# Patient Record
Sex: FEMALE | Race: OTHER | URBAN - METROPOLITAN AREA
[De-identification: names, ages, dates, MRNs, and addresses within clinical notes are randomized per-mention and may not be internally consistent; named-entity substitution may affect disease eponyms.]

---

## 2020-01-13 ENCOUNTER — OFFICE VISIT (OUTPATIENT)
Dept: FAMILY MEDICINE CLINIC | Age: 65
End: 2020-01-13

## 2020-01-13 VITALS
RESPIRATION RATE: 16 BRPM | HEART RATE: 97 BPM | SYSTOLIC BLOOD PRESSURE: 140 MMHG | OXYGEN SATURATION: 96 % | DIASTOLIC BLOOD PRESSURE: 78 MMHG | WEIGHT: 155 LBS | TEMPERATURE: 98.1 F | HEIGHT: 59 IN | BODY MASS INDEX: 31.25 KG/M2

## 2020-01-13 DIAGNOSIS — E53.8 LOW SERUM VITAMIN B12: ICD-10-CM

## 2020-01-13 DIAGNOSIS — R53.82 CHRONIC FATIGUE: ICD-10-CM

## 2020-01-13 DIAGNOSIS — Z13.1 SCREENING FOR DIABETES MELLITUS: ICD-10-CM

## 2020-01-13 DIAGNOSIS — R42 VERTIGO, INTERMITTENT: ICD-10-CM

## 2020-01-13 DIAGNOSIS — Z11.59 ENCOUNTER FOR HEPATITIS C SCREENING TEST FOR LOW RISK PATIENT: ICD-10-CM

## 2020-01-13 DIAGNOSIS — Z13.0 SCREENING FOR DEFICIENCY ANEMIA: ICD-10-CM

## 2020-01-13 DIAGNOSIS — E78.2 MIXED HYPERLIPIDEMIA: Primary | ICD-10-CM

## 2020-01-13 DIAGNOSIS — M19.90 ARTHRITIS: ICD-10-CM

## 2020-01-13 DIAGNOSIS — Z12.39 SCREENING FOR BREAST CANCER: ICD-10-CM

## 2020-01-13 PROBLEM — K21.9 GASTROESOPHAGEAL REFLUX DISEASE WITHOUT ESOPHAGITIS: Status: ACTIVE | Noted: 2020-01-13

## 2020-01-13 PROBLEM — G47.33 OSA ON CPAP: Status: ACTIVE | Noted: 2020-01-13

## 2020-01-13 PROBLEM — M81.8 OTHER OSTEOPOROSIS WITHOUT CURRENT PATHOLOGICAL FRACTURE: Status: ACTIVE | Noted: 2020-01-13

## 2020-01-13 PROBLEM — F51.01 PRIMARY INSOMNIA: Status: ACTIVE | Noted: 2020-01-13

## 2020-01-13 PROBLEM — Z99.89 OSA ON CPAP: Status: ACTIVE | Noted: 2020-01-13

## 2020-01-13 PROBLEM — H25.9 AGE-RELATED CATARACT OF BOTH EYES: Status: ACTIVE | Noted: 2020-01-13

## 2020-01-13 RX ORDER — MECLIZINE HYDROCHLORIDE 25 MG/1
25 TABLET ORAL AS NEEDED
Qty: 10 TAB | Refills: 1 | Status: SHIPPED | OUTPATIENT
Start: 2020-01-13

## 2020-01-13 RX ORDER — MECLIZINE HYDROCHLORIDE 25 MG/1
TABLET ORAL AS NEEDED
COMMUNITY
End: 2020-01-13 | Stop reason: SDUPTHER

## 2020-01-13 RX ORDER — TRAZODONE HYDROCHLORIDE 50 MG/1
50 TABLET ORAL
COMMUNITY
Start: 2019-03-19 | End: 2020-05-12 | Stop reason: SDUPTHER

## 2020-01-13 RX ORDER — CYCLOBENZAPRINE HCL 10 MG
10 TABLET ORAL AS NEEDED
COMMUNITY
End: 2020-05-12 | Stop reason: SDUPTHER

## 2020-01-13 RX ORDER — PANTOPRAZOLE SODIUM 40 MG/1
TABLET, DELAYED RELEASE ORAL
COMMUNITY
Start: 2020-01-05 | End: 2020-03-06 | Stop reason: SDUPTHER

## 2020-01-13 RX ORDER — BISMUTH SUBSALICYLATE 262 MG
1 TABLET,CHEWABLE ORAL DAILY
COMMUNITY

## 2020-01-13 RX ORDER — DICLOFENAC SODIUM 75 MG/1
75 TABLET, DELAYED RELEASE ORAL 2 TIMES DAILY
COMMUNITY
Start: 2019-09-03 | End: 2020-01-13 | Stop reason: SDUPTHER

## 2020-01-13 RX ORDER — SIMVASTATIN 10 MG/1
10 TABLET, FILM COATED ORAL
Qty: 90 TAB | Refills: 1 | Status: SHIPPED | OUTPATIENT
Start: 2020-01-13 | End: 2020-02-10 | Stop reason: SDUPTHER

## 2020-01-13 RX ORDER — LANOLIN ALCOHOL/MO/W.PET/CERES
1000 CREAM (GRAM) TOPICAL DAILY
COMMUNITY

## 2020-01-13 RX ORDER — DICLOFENAC SODIUM 75 MG/1
75 TABLET, DELAYED RELEASE ORAL
Qty: 90 TAB | Refills: 0 | Status: SHIPPED | OUTPATIENT
Start: 2020-01-13 | End: 2020-05-06 | Stop reason: SDUPTHER

## 2020-01-13 RX ORDER — SIMVASTATIN 10 MG/1
TABLET, FILM COATED ORAL
COMMUNITY
Start: 2019-11-18 | End: 2020-01-13 | Stop reason: SDUPTHER

## 2020-01-13 NOTE — PROGRESS NOTES
Ana Lilia Chow  59 y.o. female  1955  [id number]  <[id number]>     R Dany Carlson 53       Encounter Date: 1/13/2020           New Patient Visit Note: Susan Velázquez MD    Previous PCP:    Reason for Appointment:  Chief Complaint   Patient presents with    New Patient    Establish Care    Medication Refill       History of Present Illness:  History provided by patient    Ana Lilia Chow is a 59 y.o. female who presents to clinic today for:     Mixed hyperlipidemia     Duration: age 58  Medicine: Lipitor 10mg   Medication Side Effects: denies any muscle aches  Last Lipids: 4 months ago with prior PCP      Arthritis     Duration: around age 62  Location: left hip and left knee  Medicine: Voltaren 75mg PRN  Reports having injection in knee      Vertigo, intermittent     Duration: strated around age 39  Medicines: Meclizine 25mg PRN  Symptoms: imbalaance and dysquilibrium  Current Symptoms: denies  Freqeuncy: 2-3 times per month  Timing: symptoms last a few minutes to a few hours  Imaginag: denies any prior imaging  ENT: reports that a long time ago she saw an ENT who told her that she had vertigo             Review of Systems    Denies dyspnea or CP: All other ROS were reviewed and are negative except as discussed in HPI      Allergies: Sulfa (sulfonamide antibiotics)    Medications: (Updated to reflect final medication list after visit)    Current Outpatient Medications:     pantoprazole (PROTONIX) 40 mg tablet, TAKE 1 TABLET BY MOUTH ONCE DAILY, Disp: , Rfl:     traZODone (DESYREL) 50 mg tablet, 50 mg nightly. Patient reports 1 or 2 tablet at bedtime. , Disp: , Rfl:     cyclobenzaprine (FLEXERIL) 10 mg tablet, Take 10 mg by mouth as needed. Patient reports 1/2 tablet as needed, Disp: , Rfl:     multivitamin (ONE A DAY) tablet, Take 1 Tab by mouth daily. , Disp: , Rfl:     cyanocobalamin 1,000 mcg tablet, Take 1,000 mcg by mouth daily. , Disp: , Rfl:    cholecalciferol, vitamin D3, (VITAMIN D3 PO), Take  by mouth., Disp: , Rfl:     simvastatin (ZOCOR) 10 mg tablet, Take 1 Tab by mouth nightly., Disp: 90 Tab, Rfl: 1    diclofenac EC (VOLTAREN) 75 mg EC tablet, Take 1 Tab by mouth nightly as needed for Pain., Disp: 90 Tab, Rfl: 0    meclizine (ANTIVERT) 25 mg tablet, Take 1 Tab by mouth as needed for Dizziness. , Disp: 10 Tab, Rfl: 1    History  No care team member to display    Past Medical History: she has no past medical history on file. Past Surgical History: she has a past surgical history that includes hx  section and hx cholecystectomy. Family Medical History: family history includes Coronary Artery Disease in her father; Heart Disease in her mother. denies stroke, DM, or cancer    Social History: she reports that she quit smoking about 8 years ago. Her smoking use included cigarettes. She has never used smokeless tobacco. She reports previous alcohol use. She reports that she does not use drugs. Occupation: works as baker at [employer]; Home: daughter and granddaugher; cat (Tamar)      Objective:   Visit Vitals  /78 (BP 1 Location: Left arm, BP Patient Position: Sitting)   Pulse 97   Temp 98.1 °F (36.7 °C) (Oral)   Resp 16   Ht 4' 11\" (1.499 m)   Wt 155 lb (70.3 kg)   SpO2 96%   BMI 31.31 kg/m²     Wt Readings from Last 3 Encounters:   20 155 lb (70.3 kg)       Physical Exam  Vitals signs reviewed. HENT:      Head: Normocephalic and atraumatic. Right Ear: Hearing normal. No drainage. No middle ear effusion. There is no impacted cerumen. Tympanic membrane is not injected or erythematous. Left Ear: Hearing normal. No drainage. No middle ear effusion. There is no impacted cerumen. Tympanic membrane is not injected or erythematous. Nose: Nose normal. No nasal deformity or septal deviation. Mouth/Throat:      Lips: Pink. No lesions. Mouth: Mucous membranes are moist.      Palate: No mass.       Pharynx: Oropharynx is clear. Uvula midline. No pharyngeal swelling, oropharyngeal exudate or posterior oropharyngeal erythema. Tonsils: No tonsillar exudate. Eyes:      General: Lids are normal. Vision grossly intact. Gaze aligned appropriately. Extraocular Movements: Extraocular movements intact. Conjunctiva/sclera:      Right eye: Right conjunctiva is not injected. Left eye: Left conjunctiva is not injected. Pupils: Pupils are equal, round, and reactive to light. Neck:      Musculoskeletal: Normal range of motion and neck supple. No neck rigidity or muscular tenderness. Vascular: No carotid bruit. Cardiovascular:      Rate and Rhythm: Normal rate and regular rhythm. Heart sounds: No murmur. No friction rub. No gallop. Pulmonary:      Effort: No respiratory distress. Breath sounds: No wheezing, rhonchi or rales. Abdominal:      General: Bowel sounds are normal. There is no distension or abdominal bruit. Tenderness: There is no tenderness. Musculoskeletal: Normal range of motion. Cervical back: Normal.   Lymphadenopathy:      Cervical: No cervical adenopathy. Neurological:      General: No focal deficit present. Motor: Motor function is intact. Coordination: Coordination is intact. Gait: Gait is intact. Deep Tendon Reflexes:      Reflex Scores:       Patellar reflexes are 2+ on the right side and 2+ on the left side. Psychiatric:         Attention and Perception: Attention and perception normal.         Mood and Affect: Mood and affect normal.         Speech: Speech normal.         Cognition and Memory: Cognition and memory normal.         Assessment & Plan:    Diagnoses and all orders for this visit:    1. Mixed hyperlipidemia  Assessment & Plan:  Chronic, stable  - continue Lipitor  - recheck lipids in Fall, 2020    Orders:  -     simvastatin (ZOCOR) 10 mg tablet; Take 1 Tab by mouth nightly.   -     LIPID PANEL  -     METABOLIC PANEL, COMPREHENSIVE    2. Arthritis  Assessment & Plan:  Chronic, stable and improved with PRN Voltaren  - continue PRN voltaren  - consider referral to pain management if symptoms worsen or become more frequent      Orders:  -     diclofenac EC (VOLTAREN) 75 mg EC tablet; Take 1 Tab by mouth nightly as needed for Pain. 3. Vertigo, intermittent  Assessment & Plan:  Chronic, stable and improved with PRN meclizine  - continue PRN meclizine  - discussed red flag symptoms and reasons to call or go to ED  - if symptoms become more frequent, patient may wish to consider vestibular therapy    Orders:  -     meclizine (ANTIVERT) 25 mg tablet; Take 1 Tab by mouth as needed for Dizziness. 4. Chronic fatigue  -     VITAMIN D, 25 HYDROXY; Future  -     TSH 3RD GENERATION    5. Low serum vitamin B12  -     VITAMIN B12    6. Screening for diabetes mellitus  -     HEMOGLOBIN A1C WITH EAG    7. Screening for deficiency anemia  -     CBC WITH AUTOMATED DIFF    8. Screening for breast cancer  -     BEBETO MAMMO BI SCREENING INCL CAD; Future    9. Encounter for hepatitis C screening test for low risk patient  -     HCV AB W/RFLX TO JASPREET      I have discussed the diagnosis with the patient and the intended plan as seen in the above orders. The patient has received an after-visit summary along with patient information handout. I have discussed medication side effects and warnings with the patient as well. Dispostion  Follow-up and Dispositions    · Return in about 4 weeks (around 2/10/2020).            Nadia Alvarez MD

## 2020-01-13 NOTE — PATIENT INSTRUCTIONS
Arthritis: Care Instructions  Your Care Instructions  Arthritis, also called osteoarthritis, is a breakdown of the cartilage that cushions your joints. When the cartilage wears down, your bones rub against each other. This causes pain and stiffness. Many people have some arthritis as they age. Arthritis most often affects the joints of the spine, hands, hips, knees, or feet. You can take simple measures to protect your joints, ease your pain, and help you stay active. Follow-up care is a key part of your treatment and safety. Be sure to make and go to all appointments, and call your doctor if you are having problems. It's also a good idea to know your test results and keep a list of the medicines you take. How can you care for yourself at home? · Stay at a healthy weight. Being overweight puts extra strain on your joints. · Talk to your doctor or physical therapist about exercises that will help ease joint pain. ? Stretch. You may enjoy gentle forms of yoga to help keep your joints and muscles flexible. ? Walk instead of jog. Other types of exercise that are less stressful on the joints include riding a bicycle, swimming, esperanza chi, or water exercise. ? Lift weights. Strong muscles help reduce stress on your joints. Stronger thigh muscles, for example, take some of the stress off of the knees and hips. Learn the right way to lift weights so you do not make joint pain worse. · Take your medicines exactly as prescribed. Call your doctor if you think you are having a problem with your medicine. · Take pain medicines exactly as directed. ? If the doctor gave you a prescription medicine for pain, take it as prescribed. ? If you are not taking a prescription pain medicine, ask your doctor if you can take an over-the-counter medicine. · Use a cane, crutch, walker, or another device if you need help to get around. These can help rest your joints.  You also can use other things to make life easier, such as a higher toilet seat and padded handles on kitchen utensils. · Do not sit in low chairs, which can make it hard to get up. · Put heat or cold on your sore joints as needed. Use whichever helps you most. You also can take turns with hot and cold packs. ? Apply heat 2 or 3 times a day for 20 to 30 minutesusing a heating pad, hot shower, or hot packto relieve pain and stiffness. ? Put ice or a cold pack on your sore joint for 10 to 20 minutes at a time. Put a thin cloth between the ice and your skin. When should you call for help? Call your doctor now or seek immediate medical care if:    · You have sudden swelling, warmth, or pain in any joint.     · You have joint pain and a fever or rash.     · You have such bad pain that you cannot use a joint.    Watch closely for changes in your health, and be sure to contact your doctor if:    · You have mild joint symptoms that continue even with more than 6 weeks of care at home.     · You have stomach pain or other problems with your medicine. Where can you learn more? Go to http://janina-stephany.info/. Enter H326 in the search box to learn more about \"Arthritis: Care Instructions. \"  Current as of: April 1, 2019  Content Version: 12.2  © 5760-8995 [employer]. Care instructions adapted under license by [employer] (which disclaims liability or warranty for this information). If you have questions about a medical condition or this instruction, always ask your healthcare professional. [place] [address] any warranty or liability for your use of this information.

## 2020-01-13 NOTE — PROGRESS NOTES
1. Have you been to the ER, urgent care clinic since your last visit? Hospitalized since your last visit? No    2. Have you seen or consulted any other health care providers outside of the [address] [place] [address] since your last visit? Include any pap smears or colon screening. No     Last PCP Zachary BautistaJohn [place] [address]  Phone: [phone]    Chief Complaint   Patient presents with   [facility] Patient    Establish Care    Medication Refill     Visit Vitals  /78 (BP 1 Location: Left arm, BP Patient Position: Sitting)   Pulse 97   Temp 98.1 °F (36.7 °C) (Oral)   Resp 16   Ht 4' 11\" (1.499 m)   Wt 155 lb (70.3 kg)   SpO2 96%   BMI 31.31 kg/m²     Records Requested via fax to Last PCP.

## 2020-01-14 LAB
ALBUMIN SERPL-MCNC: 4.3 G/DL (ref 3.6–4.8)
ALBUMIN/GLOB SERPL: 1.7 {RATIO} (ref 1.2–2.2)
ALP SERPL-CCNC: 142 IU/L (ref 39–117)
ALT SERPL-CCNC: 71 IU/L (ref 0–32)
AST SERPL-CCNC: 45 IU/L (ref 0–40)
BASOPHILS # BLD AUTO: 0.1 X10E3/UL (ref 0–0.2)
BASOPHILS NFR BLD AUTO: 1 %
BILIRUB SERPL-MCNC: 0.3 MG/DL (ref 0–1.2)
BUN SERPL-MCNC: 8 MG/DL (ref 8–27)
BUN/CREAT SERPL: 9 (ref 12–28)
CALCIUM SERPL-MCNC: 9.4 MG/DL (ref 8.7–10.3)
CHLORIDE SERPL-SCNC: 104 MMOL/L (ref 96–106)
CHOLEST SERPL-MCNC: 267 MG/DL (ref 100–199)
CO2 SERPL-SCNC: 25 MMOL/L (ref 20–29)
COMMENT, 011824: ABNORMAL
CREAT SERPL-MCNC: 0.89 MG/DL (ref 0.57–1)
EOSINOPHIL # BLD AUTO: 0.1 X10E3/UL (ref 0–0.4)
EOSINOPHIL NFR BLD AUTO: 1 %
ERYTHROCYTE [DISTWIDTH] IN BLOOD BY AUTOMATED COUNT: 11.7 % (ref 11.7–15.4)
EST. AVERAGE GLUCOSE BLD GHB EST-MCNC: 117 MG/DL
GLOBULIN SER CALC-MCNC: 2.6 G/DL (ref 1.5–4.5)
GLUCOSE SERPL-MCNC: 89 MG/DL (ref 65–99)
HBA1C MFR BLD: 5.7 % (ref 4.8–5.6)
HCT VFR BLD AUTO: 46.1 % (ref 34–46.6)
HCV AB S/CO SERPL IA: <0.1 S/CO RATIO (ref 0–0.9)
HCV AB SERPL QL IA: NORMAL
HDLC SERPL-MCNC: 44 MG/DL
HGB BLD-MCNC: 15.1 G/DL (ref 11.1–15.9)
IMM GRANULOCYTES # BLD AUTO: 0 X10E3/UL (ref 0–0.1)
IMM GRANULOCYTES NFR BLD AUTO: 1 %
INTERPRETATION, 910389: NORMAL
LDLC SERPL CALC-MCNC: 197 MG/DL (ref 0–99)
LYMPHOCYTES # BLD AUTO: 3.3 X10E3/UL (ref 0.7–3.1)
LYMPHOCYTES NFR BLD AUTO: 43 %
MCH RBC QN AUTO: 30.8 PG (ref 26.6–33)
MCHC RBC AUTO-ENTMCNC: 32.8 G/DL (ref 31.5–35.7)
MCV RBC AUTO: 94 FL (ref 79–97)
MONOCYTES # BLD AUTO: 0.5 X10E3/UL (ref 0.1–0.9)
MONOCYTES NFR BLD AUTO: 7 %
NEUTROPHILS # BLD AUTO: 3.7 X10E3/UL (ref 1.4–7)
NEUTROPHILS NFR BLD AUTO: 47 %
PLATELET # BLD AUTO: 268 X10E3/UL (ref 150–450)
POTASSIUM SERPL-SCNC: 4 MMOL/L (ref 3.5–5.2)
PROT SERPL-MCNC: 6.9 G/DL (ref 6–8.5)
RBC # BLD AUTO: 4.91 X10E6/UL (ref 3.77–5.28)
SODIUM SERPL-SCNC: 142 MMOL/L (ref 134–144)
TRIGL SERPL-MCNC: 132 MG/DL (ref 0–149)
VIT B12 SERPL-MCNC: 620 PG/ML (ref 232–1245)
VLDLC SERPL CALC-MCNC: 26 MG/DL (ref 5–40)
WBC # BLD AUTO: 7.7 X10E3/UL (ref 3.4–10.8)

## 2020-01-14 NOTE — ASSESSMENT & PLAN NOTE
Chronic, stable and improved with PRN meclizine  - continue PRN meclizine  - discussed red flag symptoms and reasons to call or go to ED  - if symptoms become more frequent, patient may wish to consider vestibular therapy

## 2020-01-14 NOTE — ASSESSMENT & PLAN NOTE
Chronic, stable and improved with PRN Voltaren  - continue PRN voltaren  - consider referral to pain management if symptoms worsen or become more frequent

## 2020-01-15 PROBLEM — R79.89 ELEVATED LFTS: Status: ACTIVE | Noted: 2020-01-15

## 2020-01-15 NOTE — PROGRESS NOTES
Called patient and discussed lab results showing elevated LDL > 190. Patient has not taken her simvastatin in 2 months. Restarted on last visit. Will plan to recheck labs in 3 months and if LDL remains elevated, will increase simvastatin to 20mg daily.

## 2020-01-28 PROBLEM — R53.82 CHRONIC FATIGUE: Status: ACTIVE | Noted: 2020-01-28

## 2020-01-28 LAB — SPECIMEN STATUS REPORT, ROLRST: NORMAL

## 2020-01-30 ENCOUNTER — TELEPHONE (OUTPATIENT)
Dept: FAMILY MEDICINE CLINIC | Age: 65
End: 2020-01-30

## 2020-01-30 PROBLEM — E55.9 VITAMIN D INSUFFICIENCY: Status: ACTIVE | Noted: 2020-01-30

## 2020-01-30 LAB
25(OH)D3+25(OH)D2 SERPL-MCNC: 22.6 NG/ML (ref 30–100)
SPECIMEN STATUS REPORT, ROLRST: NORMAL
TSH SERPL DL<=0.005 MIU/L-ACNC: 1.45 UIU/ML (ref 0.45–4.5)

## 2020-01-30 RX ORDER — ACETAMINOPHEN 500 MG
TABLET ORAL
Qty: 180 CAP | Refills: 0 | Status: SHIPPED | OUTPATIENT
Start: 2020-01-30 | End: 2020-05-06 | Stop reason: SDUPTHER

## 2020-02-10 ENCOUNTER — OFFICE VISIT (OUTPATIENT)
Dept: FAMILY MEDICINE CLINIC | Age: 65
End: 2020-02-10

## 2020-02-10 VITALS
HEIGHT: 59 IN | BODY MASS INDEX: 31.85 KG/M2 | SYSTOLIC BLOOD PRESSURE: 124 MMHG | WEIGHT: 158 LBS | HEART RATE: 99 BPM | DIASTOLIC BLOOD PRESSURE: 66 MMHG | TEMPERATURE: 97.9 F | OXYGEN SATURATION: 97 % | RESPIRATION RATE: 16 BRPM

## 2020-02-10 DIAGNOSIS — E78.2 MIXED HYPERLIPIDEMIA: ICD-10-CM

## 2020-02-10 DIAGNOSIS — L65.9 HAIR LOSS: Primary | ICD-10-CM

## 2020-02-10 DIAGNOSIS — M19.90 ARTHRITIS: ICD-10-CM

## 2020-02-10 DIAGNOSIS — M25.50 MULTIPLE JOINT PAIN: ICD-10-CM

## 2020-02-10 RX ORDER — ALENDRONATE SODIUM 70 MG/1
70 TABLET ORAL
COMMUNITY
Start: 2019-06-18

## 2020-02-10 RX ORDER — SIMVASTATIN 20 MG/1
20 TABLET, FILM COATED ORAL
Qty: 90 TAB | Refills: 1 | Status: SHIPPED | OUTPATIENT
Start: 2020-02-10 | End: 2020-08-06 | Stop reason: SDUPTHER

## 2020-02-10 RX ORDER — AMOXICILLIN AND CLAVULANATE POTASSIUM 875; 125 MG/1; MG/1
TABLET, FILM COATED ORAL
COMMUNITY
Start: 2019-11-29 | End: 2020-02-10 | Stop reason: ALTCHOICE

## 2020-02-10 RX ORDER — DICLOFENAC SODIUM 10 MG/G
GEL TOPICAL
COMMUNITY
Start: 2019-07-03

## 2020-02-10 NOTE — PATIENT INSTRUCTIONS
Learning About High Cholesterol  What is high cholesterol? Cholesterol is a type of fat in your blood. It is needed for many body functions, such as making new cells. Cholesterol is made by your body. It also comes from food you eat. If you have too much cholesterol, it starts to build up in your arteries. This is called hardening of the arteries, or atherosclerosis. High cholesterol raises your risk of a heart attack and stroke. There are different types of cholesterol. LDL is the \"bad\" cholesterol. High LDL can raise your risk for heart disease, heart attack, and stroke. HDL is the \"good\" cholesterol. High HDL is linked with a lower risk for heart disease, heart attack, and stroke. Your cholesterol levels help your doctor find out your risk for having a heart attack or stroke. How can you prevent high cholesterol? A heart-healthy lifestyle can help you prevent high cholesterol. This lifestyle helps lower your risk for a heart attack and stroke. · Eat heart-healthy foods. ? Eat fruits, vegetables, whole grains (like oatmeal), dried beans and peas, nuts and seeds, soy products (like tofu), and fat-free or low-fat dairy products. ? Replace butter, margarine, and hydrogenated or partially hydrogenated oils with olive and canola oils. (Canola oil margarine without trans fat is fine.)  ? Replace red meat with fish, poultry, and soy protein (like tofu). ? Limit processed and packaged foods like chips, crackers, and cookies. · Be active. Exercise can improve your cholesterol level. Get at least 30 minutes of exercise on most days of the week. Walking is a good choice. You also may want to do other activities, such as running, swimming, cycling, or playing tennis or team sports. · Stay at a healthy weight. Lose weight if you need to. · Don't smoke. If you need help quitting, talk to your doctor about stop-smoking programs and medicines. These can increase your chances of quitting for good.   How is high cholesterol treated? The goal of treatment is to reduce your chances of having a heart attack or stroke. The goal is not to lower your cholesterol numbers only. · You may make lifestyle changes, such as eating healthy foods, not smoking, losing weight, and being more active. · You may have to take medicine. Follow-up care is a key part of your treatment and safety. Be sure to make and go to all appointments, and call your doctor if you are having problems. It's also a good idea to know your test results and keep a list of the medicines you take. Where can you learn more? Go to http://janina-stehpany.info/. Enter C678 in the search box to learn more about \"Learning About High Cholesterol. \"  Current as of: April 9, 2019  Content Version: 12.2  © 5022-5835 [employer]. Care instructions adapted under license by [employer] (which disclaims liability or warranty for this information). If you have questions about a medical condition or this instruction, always ask your healthcare professional. [address] any warranty or liability for your use of this information.

## 2020-02-10 NOTE — PROGRESS NOTES
Chief Complaint   Patient presents with    Hair/Scalp Problem     pt states she is loosing her hair. started 3 weeks ago.  Blood Pressure Check     \"REVIEWED RECORD IN PREPARATION FOR VISIT AND HAVE OBTAINED THE NECESSARY DOCUMENTATION\"  1. Have you been to the ER, urgent care clinic since your last visit? Hospitalized since your last visit? No    2. Have you seen or consulted any other health care providers outside of the [address] [place] [address] since your last visit? Include any pap smears or colon screening.  No

## 2020-02-10 NOTE — PROGRESS NOTES
Ana Lilia Chow  59 y.o. female  1955  [id number] [address] [facility]  [id number]     [address] [facility]       Encounter Date: 2/10/2020           Established Patient Visit Note: Susan Velázquez MD    Reason for Appointment:  Chief Complaint   Patient presents with    Hair/Scalp Problem     pt states she is loosing her hair. started 3 weeks ago.  Blood Pressure Check       History of Present Illness:  History provided by patient    Ana Lilia Chow is a  59y.o. year old female who presents to clinic today for an established patient visit. The patient  has a past medical history of Age-related cataract of both eyes (1/13/2020), Arthritis (1/13/2020), Gastroesophageal reflux disease without esophagitis (1/13/2020), Mixed hyperlipidemia (1/13/2020), Vertigo, intermittent (1/13/2020), and Vitamin D insufficiency (1/30/2020). .  The patient reports that she has been feeling well recently, and she denies any recent illnesses or hospitalizations. The patient's acute and chronic medical conditions that were addressed today are discussed in greater detail below.      Acute and Newly Addressed Problems:     Hair Loss  Location: everywhere throughout the scalp  Duration: 3 weeks, never happended before  Symptoms: patient reports that when she washes her hair large clumps of hair would come out  Associated Factors: denies any recent stress, denies any change in marcelino products  Denies any new medicines    Chronic Conditions     Mixed hyperlipidemia     Duration: age 58  Medicine: Lipitor 10mg   Medication Side Effects: denies any muscle aches  Last Lipids: (1/13/2020): Chol 267, , HDL 44, Tri 132      Arthritis     Duration: around age 62  Location: left hip and left knee, hands left and right  Medicine: Voltaren 75mg PRN  Reports having injection in knee  Interval History (2/10/2020): patient reports that she continues to have pain in multiple joints and she would like to be evaluated for rheumatologic etiology         Review of Systems  Const: denies fever, denies chills  Cardiac: denies palpitations or chest pain  Resp: denies dyspnea, denies wheezing    Allergies: Sulfa (sulfonamide antibiotics)    Medications: (Updated to reflect final medication list after visit)    Current Outpatient Medications:     diclofenac (VOLTAREN) 1 % gel, Apply 3-4 grams twice daily to affected area, Disp: , Rfl:     simvastatin (ZOCOR) 20 mg tablet, Take 1 Tab by mouth nightly., Disp: 90 Tab, Rfl: 1    cholecalciferol (VITAMIN D3) (2,000 UNITS /50 MCG) cap capsule, Take 2 capsules (4000 units) daily, Disp: 180 Cap, Rfl: 0    pantoprazole (PROTONIX) 40 mg tablet, TAKE 1 TABLET BY MOUTH ONCE DAILY, Disp: , Rfl:     traZODone (DESYREL) 50 mg tablet, 50 mg nightly. Patient reports 1 or 2 tablet at bedtime. , Disp: , Rfl:     cyclobenzaprine (FLEXERIL) 10 mg tablet, Take 10 mg by mouth as needed. Patient reports 1/2 tablet as needed, Disp: , Rfl:     multivitamin (ONE A DAY) tablet, Take 1 Tab by mouth daily. , Disp: , Rfl:     cyanocobalamin 1,000 mcg tablet, Take 1,000 mcg by mouth daily. , Disp: , Rfl:     diclofenac EC (VOLTAREN) 75 mg EC tablet, Take 1 Tab by mouth nightly as needed for Pain., Disp: 90 Tab, Rfl: 0    alendronate (FOSAMAX) 70 mg tablet, Take 70 mg by mouth., Disp: , Rfl:     meclizine (ANTIVERT) 25 mg tablet, Take 1 Tab by mouth as needed for Dizziness. , Disp: 10 Tab, Rfl: 1    History  Patient Care Team:  Violeta Stockton MD as PCP - General (Family Practice)    Past Medical History: she has a past medical history of Age-related cataract of both eyes (2020), Arthritis (2020), Gastroesophageal reflux disease without esophagitis (2020), Mixed hyperlipidemia (2020), Vertigo, intermittent (2020), and Vitamin D insufficiency (2020). Past Surgical History: she has a past surgical history that includes hx  section and hx cholecystectomy.     Family Medical History: family history includes Coronary Artery Disease in her father; Heart Disease in her mother. Social History: she reports that she quit smoking about 8 years ago. Her smoking use included cigarettes. She has never used smokeless tobacco. She reports previous alcohol use. She reports that she does not use drugs. Health Maintenance Due   Topic Date Due    DTaP/Tdap/Td series (1 - Tdap) 07/05/1966    PAP AKA CERVICAL CYTOLOGY  07/05/1976    Shingrix Vaccine Age 50> (1 of 2) 07/05/2005    Breast Cancer Screen Mammogram  07/05/2005    FOBT Q1Y Age 50-75  07/05/2005       Objective:   Visit Vitals  /66 (BP 1 Location: Left arm, BP Patient Position: Sitting)   Pulse 99   Temp 97.9 °F (36.6 °C) (Oral)   Resp 16   Ht 4' 11\" (1.499 m)   Wt 158 lb (71.7 kg)   SpO2 97%   BMI 31.91 kg/m²     Wt Readings from Last 3 Encounters:   02/10/20 158 lb (71.7 kg)   01/13/20 155 lb (70.3 kg)       Physical Exam    Assessment & Plan:    Diagnoses and all orders for this visit:    1. Hair loss: New problem with unclear etiology. Will provide referral to dermatology for further evaluation.   -     REFERRAL TO DERMATOLOGY    2. Arthritis  Assessment & Plan:  Chronic, worsening  - will checking rheum labs  - continue PRN voltaren      3. Multiple joint pain (see plan for arthritis)  -     RHEUMATOID FACTOR, QL  -     RAJNI BY MULTIPLEX FLOW IA, QL  -     SED RATE (ESR)  -     CRP, HIGH SENSITIVITY  -     URIC ACID    4. Mixed hyperlipidemia  Assessment & Plan:  Chronic, uncontrolled with LDL of 197  - increase Lipitor to 20mg dosing  - recheck lipids in 3 months    Orders:  -     simvastatin (ZOCOR) 20 mg tablet; Take 1 Tab by mouth nightly. Other orders  -     URIC ACID        I have discussed the diagnosis with the patient and the intended plan as seen in the above orders. The patient has received an after-visit summary along with patient information handout.   I have discussed medication side effects and warnings with the patient as well. Dispostion  Follow-up and Dispositions    · Return in about 3 months (around 5/10/2020) for cholesterol.            Diana Castillo MD

## 2020-02-11 LAB
ANA SER QL: NEGATIVE
CRP SERPL HS-MCNC: 3.82 MG/L (ref 0–3)
ERYTHROCYTE [SEDIMENTATION RATE] IN BLOOD BY WESTERGREN METHOD: 14 MM/HR (ref 0–40)
RHEUMATOID FACT SERPL-ACNC: <10 IU/ML (ref 0–13.9)
URATE SERPL-MCNC: 5.7 MG/DL (ref 2.5–7.1)

## 2020-02-12 NOTE — ASSESSMENT & PLAN NOTE
Chronic, uncontrolled with LDL of 197  - increase Lipitor to 20mg dosing  - recheck lipids in 3 months

## 2020-03-06 NOTE — TELEPHONE ENCOUNTER
.Pharmacy is requesting a 90 day supply on the medication. .  Requested Prescriptions     Pending Prescriptions Disp Refills    pantoprazole (PROTONIX) 40 mg tablet               . Pharmacy on file verified          LastRefill:1/13/2020          LOV: Monday, February 10, 2020  UOV:  Monday, May 11, 2020

## 2020-03-09 RX ORDER — PANTOPRAZOLE SODIUM 40 MG/1
TABLET, DELAYED RELEASE ORAL
Qty: 90 TAB | Refills: 1 | Status: SHIPPED | OUTPATIENT
Start: 2020-03-09

## 2020-03-17 ENCOUNTER — TELEPHONE (OUTPATIENT)
Dept: FAMILY MEDICINE CLINIC | Age: 65
End: 2020-03-17

## 2020-03-20 ENCOUNTER — TELEPHONE (OUTPATIENT)
Dept: FAMILY MEDICINE CLINIC | Age: 65
End: 2020-03-20

## 2020-03-20 NOTE — TELEPHONE ENCOUNTER
Pt states she the Pantoprazole is not covered by her ins, so she needs an alternative rx for 90 days sent to her pharmacy.     Pharmacy on file is verified

## 2020-03-27 NOTE — TELEPHONE ENCOUNTER
Outbound call to patient. Patient states that she has picked up Pantoprazole medication for the next couple of months. No further action is required at this time.

## 2020-04-14 ENCOUNTER — TELEPHONE (OUTPATIENT)
Dept: FAMILY MEDICINE CLINIC | Age: 65
End: 2020-04-14

## 2020-04-14 NOTE — TELEPHONE ENCOUNTER
Outbound call placed to patient. Name and  verified. Call placed to cancel/ reschedule patients Pre-op appointment. Patient reported she wanted to keep her appointment until Thursday. Patient advised we are not seeing patients in out office. Patient reported she wanted to call her eye surgeon to make sure the surgery is canceled. Patient reported she will call us back on 2020. Appointment not canceled at this time. Patient reported understanding and appreciative of call.

## 2020-04-27 ENCOUNTER — TELEPHONE (OUTPATIENT)
Dept: FAMILY MEDICINE CLINIC | Age: 65
End: 2020-04-27

## 2020-04-27 NOTE — TELEPHONE ENCOUNTER
Contacted pt at [phone] verified , pt also states she needs labs order for her 3 month f/u. I advised, no pre-op appts at this facility.  She would like to be able to drop of the paperwork    BCB#[phone]

## 2020-04-27 NOTE — TELEPHONE ENCOUNTER
----- Message from Mary Traylor sent at 4/27/2020  3:22 PM EDT -----  Regarding: Dr. Justice Garcia: [phone]  Caller's first and last name: Pt  Reason for call: Pre op for R eye cataract surgery on 05/19/2020 @12pm.  Callback required yes/no and why: Yes  Best contact number(s): [phone]  Details to clarify the request:  called to have her reschedule her 05/11/2020 appt. Has paperwork to be signed.

## 2020-04-30 ENCOUNTER — TELEPHONE (OUTPATIENT)
Dept: FAMILY MEDICINE CLINIC | Age: 65
End: 2020-04-30

## 2020-04-30 NOTE — TELEPHONE ENCOUNTER
----- Message from Zoya Matos sent at 4/29/2020  4:16 PM EDT -----  Regarding: /Telephone  General Message/Vendor Calls    Caller's first and last name:      Reason for call:  Preop    Callback required yes/no and why:  Yes, to let her know the status of the arrangements     Best contact number(s):  [phone]    Details to clarify the request:      Collin Garcia pt at [phone] could not LVM, VM not set up.

## 2020-05-01 NOTE — TELEPHONE ENCOUNTER
----- Message from Abdoulaye Pedersen sent at 4/30/2020  4:43 PM EDT -----  Regarding: /Telephone  Contact: [phone]  Caller's first and last name and relationship (if not the patient): PT  Best contact number(s): [phone]  Whose call is being returned: PT missed a call from office but does not have VoiceMail   Details to clarify the request: PT has a pre-op scheduled on May 11th, she believes it is in regards to this.

## 2020-05-06 DIAGNOSIS — M19.90 ARTHRITIS: ICD-10-CM

## 2020-05-06 RX ORDER — ACETAMINOPHEN 500 MG
TABLET ORAL
Qty: 180 CAP | Refills: 0 | Status: SHIPPED | OUTPATIENT
Start: 2020-05-06 | End: 2020-07-20 | Stop reason: SDUPTHER

## 2020-05-06 RX ORDER — DICLOFENAC SODIUM 75 MG/1
75 TABLET, DELAYED RELEASE ORAL
Qty: 90 TAB | Refills: 0 | Status: SHIPPED | OUTPATIENT
Start: 2020-05-06

## 2020-05-06 NOTE — TELEPHONE ENCOUNTER
MD Maryjo Kendrick, These were already pended but not sure if sent to you. Patient requesting refills. Last Visit: 2/10/20  MD Maryjo Kendrick  Next Appointment: 5/11/20  MD Maryjo Kendrick  Previous Refill Encounter(s):   Diclofenac 1/13/20  90  1/30/20  180    Requested Prescriptions     Pending Prescriptions Disp Refills    diclofenac EC (VOLTAREN) 75 mg EC tablet 90 Tab 0     Sig: Take 1 Tab by mouth nightly as needed for Pain.     cholecalciferol (VITAMIN D3) (2,000 UNITS /50 MCG) cap capsule 180 Cap 0     Sig: Take 2 capsules (4000 units) daily

## 2020-05-08 ENCOUNTER — TELEPHONE (OUTPATIENT)
Dept: FAMILY MEDICINE CLINIC | Age: 65
End: 2020-05-08

## 2020-05-08 NOTE — TELEPHONE ENCOUNTER
Chief Complaint   Patient presents with    Prior Auth     DICLOFENAC SODIUM 75 MG DR TABLETS     Prior Auth     APPROVED:   [place] Case [id number] Status: Approved. Authorization and Notifications Completed. Patient informed of approval of medication via [employer]. [employer] was faxed approval at [phone] with confirmation received.   Fabi Serrato LPN

## 2020-05-12 ENCOUNTER — OFFICE VISIT (OUTPATIENT)
Dept: FAMILY MEDICINE CLINIC | Age: 65
End: 2020-05-12

## 2020-05-12 VITALS
WEIGHT: 162.2 LBS | BODY MASS INDEX: 32.7 KG/M2 | TEMPERATURE: 98.5 F | HEART RATE: 93 BPM | SYSTOLIC BLOOD PRESSURE: 153 MMHG | OXYGEN SATURATION: 97 % | RESPIRATION RATE: 20 BRPM | HEIGHT: 59 IN | DIASTOLIC BLOOD PRESSURE: 84 MMHG

## 2020-05-12 DIAGNOSIS — Z01.818 PREOP EXAMINATION: Primary | ICD-10-CM

## 2020-05-12 DIAGNOSIS — L65.9 HAIR LOSS: ICD-10-CM

## 2020-05-12 DIAGNOSIS — E66.9 OBESITY (BMI 30-39.9): ICD-10-CM

## 2020-05-12 DIAGNOSIS — R03.0 ELEVATED BLOOD PRESSURE READING: ICD-10-CM

## 2020-05-12 DIAGNOSIS — Z12.31 VISIT FOR SCREENING MAMMOGRAM: ICD-10-CM

## 2020-05-12 RX ORDER — CYCLOBENZAPRINE HCL 10 MG
10 TABLET ORAL
Qty: 30 TAB | Refills: 1 | Status: SHIPPED | OUTPATIENT
Start: 2020-05-12 | End: 2020-07-08

## 2020-05-12 RX ORDER — TRAZODONE HYDROCHLORIDE 50 MG/1
100 TABLET ORAL
Qty: 60 TAB | Refills: 5 | Status: SHIPPED | OUTPATIENT
Start: 2020-05-12

## 2020-05-12 NOTE — PROGRESS NOTES
Preoperative Evaluation    Date of Exam: 5/12/2020    Jose Lynn is a 59 y.o. female (XLP:[birth year]) who presents for preoperative evaluation. Procedure/Surgery: Right cataract extraction with lens implant  Date of Procedure/Surgery: 5/19/20  Surgeon: Dr. Cox Few: Fabiana Conley  Primary Physician: George Ortega MD  Latex Allergy: no    Medical History:     Past Medical History:   Diagnosis Date    Age-related cataract of both eyes 1/13/2020    Arthritis 1/13/2020    Duration: around age 62 Location: left hip and left knee, hands left and right Medicine: Voltaren 75mg PRN Reports having injection in knee Interval History (2/10/2020): patient reports that she continues to have pain in multiple joints and she would like to be evaluated for rheumatologic etiology    Gastroesophageal reflux disease without esophagitis 1/13/2020    Mixed hyperlipidemia 1/13/2020    Duration: age 58 Medicine: Lipitor 10mg  Medication Side Effects: denies any muscle aches Last Lipids: (1/13/2020): Chol 267, , HDL 44, Tri 132      Vertigo, intermittent 1/13/2020    Duration: strated around age 39 Medicines: Meclizine 25mg PRN Symptoms: imbalaance and dysquilibrium Current Symptoms: denies Freqeuncy: 2-3 times per month Timing: symptoms last a few minutes to a few hours Imaginag: denies any prior imaging ENT: reports that a long time ago she saw an ENT who told her that she had vertigo Interval History (2/10/2020): denies any dizziness since last visit    Vitamin D insufficiency 1/30/2020    Duration: diagnosed Jan, 2020 Vit D: (1/13/2020) 22.6     Allergies: Allergies   Allergen Reactions    Sulfa (Sulfonamide Antibiotics) Swelling      Medications:     Current Outpatient Medications   Medication Sig    diclofenac EC (VOLTAREN) 75 mg EC tablet Take 1 Tab by mouth nightly as needed for Pain.     cholecalciferol (VITAMIN D3) (2,000 UNITS /50 MCG) cap capsule Take 2 capsules (4000 units) daily    diclofenac (VOLTAREN) 1 % gel Apply 3-4 grams twice daily to affected area    simvastatin (ZOCOR) 20 mg tablet Take 1 Tab by mouth nightly.  traZODone (DESYREL) 50 mg tablet 50 mg nightly. Patient reports 1 or 2 tablet at bedtime.  multivitamin (ONE A DAY) tablet Take 1 Tab by mouth daily.  cyanocobalamin 1,000 mcg tablet Take 1,000 mcg by mouth daily.  meclizine (ANTIVERT) 25 mg tablet Take 1 Tab by mouth as needed for Dizziness.  pantoprazole (PROTONIX) 40 mg tablet TAKE 1 TABLET BY MOUTH ONCE DAILY    alendronate (FOSAMAX) 70 mg tablet Take 70 mg by mouth.  cyclobenzaprine (FLEXERIL) 10 mg tablet Take 10 mg by mouth as needed. Patient reports 1/2 tablet as needed     No current facility-administered medications for this visit. Surgical History:     Past Surgical History:   Procedure Laterality Date    HX  SECTION      two c-sections    HX CHOLECYSTECTOMY       Social History:     Social History     Socioeconomic History    Marital status:      Spouse name: Not on file    Number of children: Not on file    Years of education: Not on file    Highest education level: Not on file   Tobacco Use    Smoking status: Former Smoker     Types: Cigarettes     Last attempt to quit: 2012     Years since quittin.3    Smokeless tobacco: Never Used   Substance and Sexual Activity    Alcohol use: Not Currently     Frequency: Never     Comment: rarely    Drug use: Never    Sexual activity: Not Currently     Anesthesia Complications: None  History of abnormal bleeding : None  History of Blood Transfusions: no  Health Care Directive or Living Will: no    REVIEW OF SYSTEMS:  A comprehensive review of systems was negative.       Physical Exam  Visit Vitals  /84 (BP 1 Location: Left arm, BP Patient Position: Sitting)   Pulse 93   Temp 98.5 °F (36.9 °C) (Oral)   Resp 20   Ht 4' 11\" (1.499 m)   Wt 162 lb 3.2 oz (73.6 kg)   SpO2 97%   BMI 32.76 kg/m²     General appearance: alert, well appearing, and in no distress. Chest: clear to auscultation, no wheezes, rales or rhonchi, symmetric air entry. CVS exam: normal rate, regular rhythm, normal S1, S2, no murmurs, rubs, clicks or gallops, normal bilateral carotid upstroke without bruits, no JVD. Exam of extremities: peripheral pulses normal, no pedal edema, no clubbing or cyanosis      IMPRESSION:   Based on the above evaluation, the patient is stable and cleared for surgery.         Dai Perez NP   5/12/2020

## 2020-05-12 NOTE — PROGRESS NOTES
Chief Complaint   Patient presents with    Pre-op Exam     1. Have you been to the ER, urgent care clinic since your last visit? Hospitalized since your last visit? No    2. Have you seen or consulted any other health care providers outside of the [address] [place] [address] since your last visit? Include any pap smears or colon screening. Yes When: [facility]        Patient presents in office for Preop for cataracts  Also requesting new referral for dermatology for Dr Miguel A Wang.

## 2020-05-12 NOTE — PATIENT INSTRUCTIONS
Cirugía de cataratas: Antes de Dion Rash Cataract Surgery: Before Your Surgery Qué es [place] de cataratas? Las cataratas son Bhavna Dragon opacas (turbias) en el cristalino del ayush. El cristalino se encuentra detrás de la parte coloreada del ayush (iris). Guerra función es enfocar la salma en la parte de atrás del ayush. En [employer], las cataratas impiden que la salma llegue a la parte de atrás del ayush. [place] puede causar problemas en la visión. La cirugía de cataratas le permite yunier mejor. Reemplaza el cristalino natural, que se ha vuelto opaco, por dannielle artificial transparente. Ryan Baca tipos de [place] de cataratas: La facoemulsificación es el tipo más común. El médico le hace un pequeño pedro en el ayush. [place] pedro se llama incisión. El médico utiliza un instrumento ultrasónico especial para desintegrar el cristalino opaco. A veces también se utiliza un láser. Luego, retira las partículas pequeñas del cristalino a través de la incisión. En la [employer], [place] médico luego inserta un cristalino artificial a través de la incisión. La mayoría de las personas no necesitan puntos de sutura, porque la incisión es muy pequeña. Si el médico no puede insertar un cristalino artificial, usted podría utilizar un lente de contacto o anteojos gruesos en lugar del cristalino natural. 
La extracción extracapsular es un tipo de cirugía de cataratas menos común. El médico hace yari incisión más tatyana para retirar el cristalino entero de yari viktoriya vez. Después de que el médico retira el cristalino, cose la incisión. La recuperación de ashanti tipo de cirugía lleva más tiempo. Antes de cualquiera de [address] tipos de [facility] [place], el médico le coloca gotas que le adormecen el ayush. Algunos médicos usan yari inyección en guerra lugar. También se le podría administrar un medicamento para ayudarle a relajarse. Es probable que no sienta mucho dolor.  La cirugía dura de 20 a 40 minutos, aproximadamente. Después de la [facility], podría tener yari venda o un protector que cubra el ayush. Después de la [facility], es probable que regrese a guerra hogar después de 1 hora en la odin de recuperación. La mayoría de las personas jeannie mejor 1 a 3 días después. Es posible que pueda volver a guerra trabajo o rutina normal pocos días después. El ayush podría tardar de 3 a 10 semanas en sanar por completo. Después de que sane el ayush, posiblemente necesite seguir usando anteojos, en especial para leer. La atención de seguimiento es yari parte clave de guerra tratamiento y seguridad. Asegúrese de hacer y acudir a todas las citas, y llame a guerra médico si está teniendo problemas. También es yari buena idea saber los resultados de eve exámenes y mantener yari lista de los medicamentos que aurora. Tuan Ply antes de la cirugía? 
 La cirugía puede ser estresante. Esta información le ayudará a entender qué puede esperar. Y le ayudará a prepararse de manera bravo para guerra cirugía. 
 Cómo prepararse para la [place] 
  · Entienda exactamente qué cirugía está planificada, junto con los [place], los beneficios y las otras alternativas. · Infórmeles a eve médicos sobre [employer], las vitaminas, los suplementos y los len herbarios que aurora. Algunos de estos pueden aumentar el riesgo de sangrados o interactuar con la anestesia.  
  · Si aurora aspirina o cualquier otro medicamento que previene los coágulos de Derek, pregúntele a guerra médico si debería dejar de tomarlo antes de guerra operación. Asegúrese de entender exactamente lo que guerra médico quiere que randi. Estos medicamentos aumentan el riesgo de sangrado.  
  · Guerra médico le dirá qué medicamentos yuli o dejar de yuli antes de la cirugía. Es posible que deba dejar de yuli ciertos medicamentos yari semana o más antes de la [place], así que hable con guerra médico tan pronto martinez pueda.  
  · Informe a guerra médico si tiene instrucciones médicas por anticipado. Estas pueden incluir un testamento vital y un poder legal permanente para la atención médica. Lleve consigo yari copia cuando vaya al hospital. Si no las tiene, sería conveniente prepararlas. Carito Blakely [place], [place] médico y seres queridos sabrán eve deseos sobre la [place]. Los médicos recomiendan que todas las personas preparen estos documentos antes de cualquier tipo de [facility] o procedimiento. Collene Console el día de la cirugía? · Siga en forma precisa las instrucciones sobre cuándo debe dejar de comer y beber. Si no lo hace, la cirugía podría ser cancelada. Si guerra médico le dijo que tome eve medicamentos el día de la [facility], tómelos con un solo sorbo de agua.  
  · Báñese o dúchese antes de registrarse para la [facility]. No use lociones, perfumes, desodorantes ni esmalte de uñas.   · Quítese todas las joyas y los \"piercings\". Si lleva lentes de contacto, quíteselos también.  
 En el hospital o centro quirúrgico 
 · [employer] un documento de identidad con foto.  
  · A menudo, se marcará la soledad en que se va a realizar la cirugía para asegurarse de que no haya errores.  
  · El anestesista le hará sentir cómodo y seguro. Es posible que la anestesia lo radni dormir. O ivan vez adormezca solo la soledad que se va a operar.  
  · La cirugía durará de 20 a 40 minutos, aproximadamente. El regreso a guerra hogar · Asegúrese de que alguien lo lleve a guerra hogar. La anestesia y los analgésicos (medicamentos para el dolor) hacen que no sea seguro que july Sung.  
  · Recibirá instrucciones más específicas acerca de la recuperación de la cirugía. [employer] alimentación, el cuidado de las heridas, la atención de seguimiento, el manejo de vehículos y la vuelta a guerra rutina normal.  
  · Podría tener yari venda o un parche para cubrir el ayush. También podría tener un protector transparente sobre el ayush. [place] previene el que usted lo frote. Cuándo debe llamar a guerra médico? 
 · Tiene preguntas o inquietudes.   · No entiende cómo debe prepararse para la [facility].  
  · Se enferma antes de la cirugía (por ejemplo, tiene fiebre, un resfriado o gripe).  
  · [employer] la cirugía o cambió de opinión acerca de operarse. Dónde puede encontrar más información en inglés? Soundra Osgood a http://janina-stephany.info/ Escriba K474 en la búsqueda para aprender más acerca de \"Cirugía de cataratas: Antes de la [facility]. \" Revisado: 17 regimbre, 2019Versión del contenido: 12.4 © 1122-2812 Healthwise, Incorporated. Las instrucciones de cuidado fueron adaptadas bajo licencia por [employer] (which disclaims liability or warranty for this information). Si usted tiene [place] afección médica o sobre estas instrucciones, siempre pregunte a guerra profesional de sara. Healthwise, Incorporated niega toda garantía o responsabilidad por guerra uso de esta información.

## 2020-05-13 ENCOUNTER — TELEPHONE (OUTPATIENT)
Dept: FAMILY MEDICINE CLINIC | Age: 65
End: 2020-05-13

## 2020-05-13 NOTE — TELEPHONE ENCOUNTER
Patient is requesting the paper work from her [address] [place] [address] , she needs a copy for herself .  She states her surgery is on 5/19  BCB# [phone]

## 2020-05-14 NOTE — TELEPHONE ENCOUNTER
Printed copy of pre op paperwork for patient. Called patient. Name and  verified. Advised that forms were completed.  She requested to place up front for

## 2020-07-20 NOTE — TELEPHONE ENCOUNTER
Last Visit: 5/12/20  NP Braeden  Next Appointment: Not scheduled  Previous Refill Encounter(s): 5/6/20  #180    Requested Prescriptions     Pending Prescriptions Disp Refills    cholecalciferol (VITAMIN D3) (2,000 UNITS /50 MCG) cap capsule 180 Cap 1     Sig: Take 2 capsules (4000 units) daily

## 2020-07-22 RX ORDER — ACETAMINOPHEN 500 MG
TABLET ORAL
Qty: 180 CAP | Refills: 1 | Status: SHIPPED | OUTPATIENT
Start: 2020-07-22

## 2020-07-28 DIAGNOSIS — M19.90 ARTHRITIS: ICD-10-CM

## 2020-07-28 NOTE — TELEPHONE ENCOUNTER
Last Visit: 5/12/20  NP Braeden  Next Appointment: Not scheduled  Previous Refill Encounter(s): 5/6/20  #90    Requested Prescriptions     Pending Prescriptions Disp Refills    diclofenac EC (VOLTAREN) 75 mg EC tablet 90 Tab 0     Sig: Take 1 Tab by mouth nightly as needed for Pain.

## 2020-07-29 RX ORDER — DICLOFENAC SODIUM 75 MG/1
75 TABLET, DELAYED RELEASE ORAL
Qty: 90 TAB | Refills: 0 | OUTPATIENT
Start: 2020-07-29

## 2020-08-06 DIAGNOSIS — E78.2 MIXED HYPERLIPIDEMIA: ICD-10-CM

## 2020-08-06 RX ORDER — SIMVASTATIN 20 MG/1
20 TABLET, FILM COATED ORAL
Qty: 90 TAB | Refills: 1 | Status: SHIPPED | OUTPATIENT
Start: 2020-08-06

## 2022-03-18 PROBLEM — F51.01 PRIMARY INSOMNIA: Status: ACTIVE | Noted: 2020-01-13

## 2022-03-18 PROBLEM — E78.2 MIXED HYPERLIPIDEMIA: Status: ACTIVE | Noted: 2020-01-13

## 2022-03-18 PROBLEM — E55.9 VITAMIN D INSUFFICIENCY: Status: ACTIVE | Noted: 2020-01-30

## 2022-03-19 PROBLEM — G47.33 OSA ON CPAP: Status: ACTIVE | Noted: 2020-01-13

## 2022-03-19 PROBLEM — Z99.89 OSA ON CPAP: Status: ACTIVE | Noted: 2020-01-13

## 2022-03-19 PROBLEM — R79.89 ELEVATED LFTS: Status: ACTIVE | Noted: 2020-01-15

## 2022-03-19 PROBLEM — H25.9 AGE-RELATED CATARACT OF BOTH EYES: Status: ACTIVE | Noted: 2020-01-13

## 2022-03-19 PROBLEM — R42 VERTIGO, INTERMITTENT: Status: ACTIVE | Noted: 2020-01-13

## 2022-03-19 PROBLEM — M19.90 ARTHRITIS: Status: ACTIVE | Noted: 2020-01-13

## 2022-03-19 PROBLEM — M81.8 OTHER OSTEOPOROSIS WITHOUT CURRENT PATHOLOGICAL FRACTURE: Status: ACTIVE | Noted: 2020-01-13

## 2022-03-20 PROBLEM — K21.9 GASTROESOPHAGEAL REFLUX DISEASE WITHOUT ESOPHAGITIS: Status: ACTIVE | Noted: 2020-01-13

## 2022-03-20 PROBLEM — R53.82 CHRONIC FATIGUE: Status: ACTIVE | Noted: 2020-01-28

## 2023-04-15 ENCOUNTER — APPOINTMENT (OUTPATIENT)
Dept: GENERAL RADIOLOGY | Age: 68
End: 2023-04-15
Attending: NURSE PRACTITIONER
Payer: MEDICARE

## 2023-04-15 ENCOUNTER — HOSPITAL ENCOUNTER (EMERGENCY)
Age: 68
Discharge: HOME | End: 2023-04-15
Attending: EMERGENCY MEDICINE
Payer: MEDICARE

## 2023-04-15 VITALS
TEMPERATURE: 98.5 F | OXYGEN SATURATION: 100 % | HEART RATE: 81 BPM | RESPIRATION RATE: 20 BRPM | SYSTOLIC BLOOD PRESSURE: 154 MMHG | DIASTOLIC BLOOD PRESSURE: 82 MMHG

## 2023-04-15 DIAGNOSIS — R11.0 NAUSEA WITHOUT VOMITING: ICD-10-CM

## 2023-04-15 DIAGNOSIS — R07.9 CHEST PAIN, UNSPECIFIED TYPE: Primary | ICD-10-CM

## 2023-04-15 LAB
ALBUMIN SERPL-MCNC: 3.6 G/DL (ref 3.5–5)
ALBUMIN/GLOB SERPL: 0.9 (ref 1.1–2.2)
ALP SERPL-CCNC: 193 U/L (ref 45–117)
ALT SERPL-CCNC: 90 U/L (ref 12–78)
ANION GAP SERPL CALC-SCNC: 3 MMOL/L (ref 5–15)
AST SERPL-CCNC: 105 U/L (ref 15–37)
BASOPHILS # BLD: 0 K/UL (ref 0–0.1)
BASOPHILS NFR BLD: 0 % (ref 0–1)
BILIRUB SERPL-MCNC: 0.4 MG/DL (ref 0.2–1)
BUN SERPL-MCNC: 16 MG/DL (ref 6–20)
BUN/CREAT SERPL: 15 (ref 12–20)
CALCIUM SERPL-MCNC: 9.8 MG/DL (ref 8.5–10.1)
CHLORIDE SERPL-SCNC: 106 MMOL/L (ref 97–108)
CO2 SERPL-SCNC: 30 MMOL/L (ref 21–32)
COMMENT, HOLDF: NORMAL
CREAT SERPL-MCNC: 1.04 MG/DL (ref 0.55–1.02)
DIFFERENTIAL METHOD BLD: ABNORMAL
EOSINOPHIL # BLD: 0.1 K/UL (ref 0–0.4)
EOSINOPHIL NFR BLD: 1 % (ref 0–7)
ERYTHROCYTE [DISTWIDTH] IN BLOOD BY AUTOMATED COUNT: 12.4 % (ref 11.5–14.5)
GLOBULIN SER CALC-MCNC: 4 G/DL (ref 2–4)
GLUCOSE SERPL-MCNC: 83 MG/DL (ref 65–100)
HCT VFR BLD AUTO: 40 % (ref 35–47)
HGB BLD-MCNC: 13.1 G/DL (ref 11.5–16)
IMM GRANULOCYTES # BLD AUTO: 0 K/UL (ref 0–0.04)
IMM GRANULOCYTES NFR BLD AUTO: 1 % (ref 0–0.5)
LIPASE SERPL-CCNC: 168 U/L (ref 73–393)
LYMPHOCYTES # BLD: 2.7 K/UL (ref 0.8–3.5)
LYMPHOCYTES NFR BLD: 33 % (ref 12–49)
MCH RBC QN AUTO: 32 PG (ref 26–34)
MCHC RBC AUTO-ENTMCNC: 32.8 G/DL (ref 30–36.5)
MCV RBC AUTO: 97.6 FL (ref 80–99)
MONOCYTES # BLD: 0.6 K/UL (ref 0–1)
MONOCYTES NFR BLD: 7 % (ref 5–13)
NEUTS SEG # BLD: 4.7 K/UL (ref 1.8–8)
NEUTS SEG NFR BLD: 58 % (ref 32–75)
NRBC # BLD: 0 K/UL (ref 0–0.01)
NRBC BLD-RTO: 0 PER 100 WBC
PLATELET # BLD AUTO: 279 K/UL (ref 150–400)
PMV BLD AUTO: 8.7 FL (ref 8.9–12.9)
POTASSIUM SERPL-SCNC: 3.5 MMOL/L (ref 3.5–5.1)
PROT SERPL-MCNC: 7.6 G/DL (ref 6.4–8.2)
RBC # BLD AUTO: 4.1 M/UL (ref 3.8–5.2)
SAMPLES BEING HELD,HOLD: NORMAL
SODIUM SERPL-SCNC: 139 MMOL/L (ref 136–145)
TROPONIN I SERPL HS-MCNC: 5 NG/L (ref 0–37)
TROPONIN I SERPL HS-MCNC: 7 NG/L (ref 0–37)
WBC # BLD AUTO: 8.2 K/UL (ref 3.6–11)

## 2023-04-15 PROCEDURE — 80053 COMPREHEN METABOLIC PANEL: CPT

## 2023-04-15 PROCEDURE — 93005 ELECTROCARDIOGRAM TRACING: CPT

## 2023-04-15 PROCEDURE — 36415 COLL VENOUS BLD VENIPUNCTURE: CPT

## 2023-04-15 PROCEDURE — 83690 ASSAY OF LIPASE: CPT

## 2023-04-15 PROCEDURE — 99285 EMERGENCY DEPT VISIT HI MDM: CPT

## 2023-04-15 PROCEDURE — 84484 ASSAY OF TROPONIN QUANT: CPT

## 2023-04-15 PROCEDURE — 71046 X-RAY EXAM CHEST 2 VIEWS: CPT

## 2023-04-15 PROCEDURE — 85025 COMPLETE CBC W/AUTO DIFF WBC: CPT

## 2023-04-15 PROCEDURE — 74011250637 HC RX REV CODE- 250/637: Performed by: NURSE PRACTITIONER

## 2023-04-15 PROCEDURE — 74011000250 HC RX REV CODE- 250: Performed by: NURSE PRACTITIONER

## 2023-04-15 RX ORDER — PANTOPRAZOLE SODIUM 40 MG/1
40 TABLET, DELAYED RELEASE ORAL DAILY
Qty: 20 TABLET | Refills: 0 | Status: SHIPPED | OUTPATIENT
Start: 2023-04-15 | End: 2023-05-05

## 2023-04-15 RX ORDER — GUAIFENESIN 100 MG/5ML
81 LIQUID (ML) ORAL DAILY
Qty: 30 TABLET | Refills: 0 | Status: SHIPPED | OUTPATIENT
Start: 2023-04-15

## 2023-04-15 RX ORDER — GUAIFENESIN 100 MG/5ML
324 LIQUID (ML) ORAL
Status: COMPLETED | OUTPATIENT
Start: 2023-04-15 | End: 2023-04-15

## 2023-04-15 RX ADMIN — ASPIRIN 81 MG CHEWABLE TABLET 324 MG: 81 TABLET CHEWABLE at 17:27

## 2023-04-15 RX ADMIN — Medication 40 ML: at 17:28

## 2023-04-16 LAB
ATRIAL RATE: 83 BPM
CALCULATED P AXIS, ECG09: 70 DEGREES
CALCULATED R AXIS, ECG10: 6 DEGREES
CALCULATED T AXIS, ECG11: 67 DEGREES
DIAGNOSIS, 93000: NORMAL
P-R INTERVAL, ECG05: 160 MS
Q-T INTERVAL, ECG07: 368 MS
QRS DURATION, ECG06: 100 MS
QTC CALCULATION (BEZET), ECG08: 432 MS
VENTRICULAR RATE, ECG03: 83 BPM

## 2023-05-26 RX ORDER — CYCLOBENZAPRINE HCL 10 MG
10 TABLET ORAL 3 TIMES DAILY PRN
COMMUNITY
Start: 2020-07-08

## 2023-05-26 RX ORDER — MECLIZINE HYDROCHLORIDE 25 MG/1
25 TABLET ORAL PRN
COMMUNITY
Start: 2020-01-13

## 2023-05-26 RX ORDER — SIMVASTATIN 20 MG
20 TABLET ORAL
COMMUNITY
Start: 2020-08-06

## 2023-05-26 RX ORDER — ASPIRIN 81 MG/1
81 TABLET, CHEWABLE ORAL DAILY
COMMUNITY
Start: 2023-04-15

## 2023-05-26 RX ORDER — TRAZODONE HYDROCHLORIDE 50 MG/1
100 TABLET ORAL
COMMUNITY
Start: 2020-05-12

## 2023-05-26 RX ORDER — ALENDRONATE SODIUM 70 MG/1
70 TABLET ORAL
COMMUNITY
Start: 2019-06-18

## 2023-05-26 RX ORDER — DICLOFENAC SODIUM 75 MG/1
75 TABLET, DELAYED RELEASE ORAL
COMMUNITY
Start: 2020-05-06